# Patient Record
Sex: MALE | Race: WHITE | URBAN - METROPOLITAN AREA
[De-identification: names, ages, dates, MRNs, and addresses within clinical notes are randomized per-mention and may not be internally consistent; named-entity substitution may affect disease eponyms.]

---

## 2024-06-04 ENCOUNTER — TELEPHONE (OUTPATIENT)
Age: 74
End: 2024-06-04

## 2024-06-04 NOTE — TELEPHONE ENCOUNTER
Referral received from YRIS Singh to be seen for Possible memory loss. I left a message to call us if he'd like to schedule an appointment with Dr. Sams.

## 2024-06-10 NOTE — TELEPHONE ENCOUNTER
Second message left to call back if he'd like to schedule an appointment to be seen for Possible memory loss.